# Patient Record
Sex: MALE | Race: OTHER | HISPANIC OR LATINO | URBAN - METROPOLITAN AREA
[De-identification: names, ages, dates, MRNs, and addresses within clinical notes are randomized per-mention and may not be internally consistent; named-entity substitution may affect disease eponyms.]

---

## 2017-12-03 ENCOUNTER — EMERGENCY (EMERGENCY)
Facility: HOSPITAL | Age: 27
LOS: 1 days | Discharge: HOME | End: 2017-12-03
Attending: EMERGENCY MEDICINE | Admitting: EMERGENCY MEDICINE
Payer: SELF-PAY

## 2017-12-03 VITALS
RESPIRATION RATE: 16 BRPM | HEART RATE: 98 BPM | TEMPERATURE: 98 F | DIASTOLIC BLOOD PRESSURE: 100 MMHG | OXYGEN SATURATION: 99 % | SYSTOLIC BLOOD PRESSURE: 188 MMHG

## 2017-12-03 PROCEDURE — 90471 IMMUNIZATION ADMIN: CPT

## 2017-12-03 PROCEDURE — 12001 RPR S/N/AX/GEN/TRNK 2.5CM/<: CPT

## 2017-12-03 PROCEDURE — 99284 EMERGENCY DEPT VISIT MOD MDM: CPT | Mod: 25

## 2017-12-03 PROCEDURE — 99283 EMERGENCY DEPT VISIT LOW MDM: CPT | Mod: 25

## 2017-12-03 PROCEDURE — 90715 TDAP VACCINE 7 YRS/> IM: CPT

## 2017-12-03 RX ORDER — AMLODIPINE BESYLATE 2.5 MG/1
0 TABLET ORAL
Qty: 0 | Refills: 0 | COMMUNITY

## 2017-12-03 RX ORDER — TETANUS TOXOID, REDUCED DIPHTHERIA TOXOID AND ACELLULAR PERTUSSIS VACCINE, ADSORBED 5; 2.5; 8; 8; 2.5 [IU]/.5ML; [IU]/.5ML; UG/.5ML; UG/.5ML; UG/.5ML
0.5 SUSPENSION INTRAMUSCULAR ONCE
Qty: 0 | Refills: 0 | Status: COMPLETED | OUTPATIENT
Start: 2017-12-03 | End: 2017-12-03

## 2017-12-03 RX ADMIN — TETANUS TOXOID, REDUCED DIPHTHERIA TOXOID AND ACELLULAR PERTUSSIS VACCINE, ADSORBED 0.5 MILLILITER(S): 5; 2.5; 8; 8; 2.5 SUSPENSION INTRAMUSCULAR at 22:59

## 2017-12-03 NOTE — ED PROVIDER NOTE - PROGRESS NOTE DETAILS
5.0 sutures x 3, advised suture removal in 10 days, follow up with him pmd, return to ed if any signs of infection

## 2017-12-03 NOTE — ED PROVIDER NOTE - ATTENDING CONTRIBUTION TO CARE
28yo male who presents with laceration to right 3rd distal finger after cutting it at work with a slicer  exam shows +4cm lac to distal right 3rd finger, neurovasc intact  plan:suture and d/c home  agree with assessment and plan of PA

## 2019-10-04 NOTE — ED PROVIDER NOTE - OBJECTIVE STATEMENT
Patient:   BETI ARMSTRONG            MRN: [id number]            FIN: [id number]               Age:   71 years     Sex:  FEMALE     :  41   Associated Diagnoses:   None   Author:   MARLIN PACE      Postoperative Information      Postoperative Information   Postoperative Information:          Preoperative Diagnosis:    RIGHT MDDLE FINGER TRIGGER, RIGHT INDEX FINGER TRIGGER, RIGHT LITTLE FINGER TRIGGER  .         Postoperative Diagnosis: Same As Pre-Op Dx,    RIGHT MDDLE FINGER TRIGGER, RIGHT INDEX FINGER TRIGGER, RIGHT LITTLE FINGER TRIGGER  .         Performed by: MARLIN PACE LABANA-MD, NEAL (Surgeon - Primary)  .         Assistant: None.         Procedures Performed:    Finger Trigger Release, RIGHT INDEX FINGER, MIDDLE FINGER AND LITTLE FINGER TENDON SHEATH INCISION, Right  .         Findings: No Abnormal Findings.         Specimens Removed: None.         Estimated Blood Loss: 0  ml.         Blood Administered: No.         Complications: None.         Grafts/Implants: None.    Anesthetic utilized:  Monitored anesthesia care,    JESSIE FRASER, [employer] (Supervisor)  TRACY CAPELLAN (Provider)  .         Regional: [facility].             Electronically Signed On 2017 07:53  __________________________________________________   MARLIN PACE     28 yo male presents with right third finger tip laceration sustained today at work by a slicer, needs tetanus.  PMd Dr Medel  patient is a dialysis patient has a left arm fistula, hx of htn  states a coworker tried to help him stop the bleeding by a applying a black suave